# Patient Record
Sex: MALE | Race: WHITE | Employment: FULL TIME | URBAN - METROPOLITAN AREA
[De-identification: names, ages, dates, MRNs, and addresses within clinical notes are randomized per-mention and may not be internally consistent; named-entity substitution may affect disease eponyms.]

---

## 2021-11-27 ENCOUNTER — HOSPITAL ENCOUNTER (EMERGENCY)
Facility: CLINIC | Age: 51
Discharge: HOME | End: 2021-11-27
Attending: EMERGENCY MEDICINE
Payer: COMMERCIAL

## 2021-11-27 VITALS
TEMPERATURE: 97.6 F | DIASTOLIC BLOOD PRESSURE: 79 MMHG | HEIGHT: 74 IN | OXYGEN SATURATION: 100 % | RESPIRATION RATE: 20 BRPM | WEIGHT: 310 LBS | SYSTOLIC BLOOD PRESSURE: 158 MMHG | BODY MASS INDEX: 39.78 KG/M2 | HEART RATE: 53 BPM

## 2021-11-27 DIAGNOSIS — S70.12XA CONTUSION OF LEFT THIGH, INITIAL ENCOUNTER: ICD-10-CM

## 2021-11-27 DIAGNOSIS — S09.90XA CLOSED HEAD INJURY, INITIAL ENCOUNTER: Primary | ICD-10-CM

## 2021-11-27 PROCEDURE — 99284 EMERGENCY DEPT VISIT MOD MDM: CPT

## 2021-11-27 RX ORDER — LEVOTHYROXINE SODIUM 0.05 MG/1
50 TABLET ORAL DAILY
COMMUNITY

## 2021-11-27 ASSESSMENT — ENCOUNTER SYMPTOMS
RESPIRATORY NEGATIVE: 1
EYES NEGATIVE: 1
GASTROINTESTINAL NEGATIVE: 1

## 2021-11-27 ASSESSMENT — PAIN SCALES - GENERAL: PAINLEVEL_OUTOF10: 7

## 2021-11-27 NOTE — ED PROVIDER NOTES
[facility]  [facility]  Phone: [phone]        Pt Name: Kristi Lewis  MRN: [id number]  [place] 1970  Date of evaluation: 11/27/21    [address] [place] [address]       Chief Complaint   Patient presents with    Fall     pt states slipped and fell in shower and hit head on toilet today, denies loc, hit back left head. and left leg. HISTORY OF PRESENT ILLNESS (Location/Symptom, Timing/Onset, Context/Setting, Quality, Duration, Modifying Factors, Severity)      Kristi Lewis is a 54 y.o. male with no pertinent PMH who presents to the ED via private auto with some head pain after a slip and fall in the shower today when he fell out of the shower hit his head on the toilet. He denies any loss conscious or syncope to cause a fall. States he is able to initiate ready for work went to work, became dizzy where the dizziness resolved once he was able to sit down. He denies any change in vision, vomiting, neck pain, back pain, weakness, or numbness or tingling. He states he does not take any blood thinners and has been able to ambulate since injury. He also states that he hit his left thigh against the toilet as well but has been able to bear weight since injury. PAST MEDICAL / SURGICAL / SOCIAL / FAMILY HISTORY     PMH:  has a past medical history of Diabetes mellitus ([address] and Thyroid disease. Surgical History:  has no past surgical history on file. Social History:  reports that he has never smoked. He has never used smokeless tobacco. He reports current alcohol use. He reports that he does not use drugs. Family History: has no family status information on file. family history is not on file. Psychiatric History: None    Allergies: Patient has no known allergies. Home Medications:   Prior to Admission medications    Medication Sig Start Date End Date Taking?  Authorizing Provider   metFORMIN (GLUCOPHAGE) 1000 MG tablet Take 1,000 mg by mouth 2 times daily (with meals)   Yes Historical Provider, MD   levothyroxine (SYNTHROID) 50 MCG tablet Take 50 mcg by mouth Daily   Yes Historical Provider, MD       REVIEW OF SYSTEMS  (2-9 systems for level 4, 10 ormore for level 5)      Review of Systems   Constitutional: Negative. HENT: Negative. Eyes: Negative. Respiratory: Negative. Cardiovascular: Negative. Gastrointestinal: Negative. Genitourinary: Negative. Musculoskeletal: Positive for myalgias. Skin: Negative. Neurological: Positive for dizziness. Negative for tremors, seizures, syncope, facial asymmetry, speech difficulty, weakness, light-headedness, numbness and headaches. All other systems negative except as marked. PHYSICAL EXAM  (up to 7 for level 4, 8 or more for level 5)      INITIAL VITALS:  height is 6' 2\" (1.88 m) and weight is 140.6 kg (310 lb) (abnormal). His oral temperature is 97.6 °F (36.4 °C). His blood pressure is 158/79 (abnormal) and his pulse is 53. His respiration is 20 and oxygen saturation is 100%. Vital signs reviewed. Physical Exam  Constitutional:       General: He is not in acute distress. Appearance: Normal appearance. He is not toxic-appearing. HENT:      Head: Normocephalic. Contusion present. Nose: Nose normal. No congestion or rhinorrhea. Mouth/Throat:      Mouth: Mucous membranes are moist.      Pharynx: Oropharynx is clear. Eyes:      Extraocular Movements: Extraocular movements intact. Conjunctiva/sclera: Conjunctivae normal.      Pupils: Pupils are equal, round, and reactive to light. Neck:      Trachea: Trachea and phonation normal.   Cardiovascular:      Rate and Rhythm: Normal rate and regular rhythm. Pulses: Normal pulses. Heart sounds: Normal heart sounds. Pulmonary:      Effort: Pulmonary effort is normal.      Breath sounds: Normal breath sounds. Abdominal:      General: Abdomen is flat. Palpations: Abdomen is soft.    Musculoskeletal: General: Normal range of motion. Cervical back: Normal range of motion and neck supple. No rigidity or tenderness. No spinous process tenderness or muscular tenderness. Normal range of motion. Right hip: Normal.      Left hip: Normal.      Right upper leg: Normal.      Left upper leg: Swelling present. Right knee: Normal.      Left knee: Normal.      Right lower leg: Normal.      Left lower leg: Normal.      Right ankle: Normal.      Right Achilles Tendon: Normal.      Left ankle: Normal.      Left Achilles Tendon: Normal.      Right foot: Normal.      Left foot: Normal.        Legs:    Skin:     General: Skin is warm and dry. Capillary Refill: Capillary refill takes less than 2 seconds. Neurological:      General: No focal deficit present. Mental Status: He is alert. Mental status is at baseline. Cranial Nerves: No cranial nerve deficit. Sensory: No sensory deficit. Motor: No weakness. Gait: Gait normal.   Psychiatric:         Mood and Affect: Mood normal.         Behavior: Behavior normal.           DIFFERENTIAL DIAGNOSIS / MDM     After my physical exam, I did offer the patient a CT scan of his head to rule out any intracranial abnormality causing patient symptoms after his fall. I also offer the patient an x-ray of the left femur as he does complain of some left leg pain and does have a contusion to his left thigh. Patient did refuse imaging at this time saying that he is unable to afford it and states that he believes he just has a concussion and wanted to be examined. Patient does not take any blood thinners at this time. Patient is right mind and is alert and oriented x3 during my exam.  I did recommend that the patient return to the ER with any worsening symptoms, headache, dizziness, vision changes, neck pain, back pain, nausea vomiting diarrhea, chest pain, shortness of breath or any mental status change.   Also recommend the patient follow-up with his PCP on Monday regardless for monitoring the symptoms. Patient is agreement with this plan at this time. All question concerns answered at this time. The patient presents with a closed head injury. The patient is neurologically intact. The presentation does not suggest a serious head injury. Signs and symptoms of a serious head injury have been discussed with the patient and caregiver, who will be vigilant for these. Concerns of repeat head injury have also been discussed. The patient has been observed adequately in the ED. Pt has been instructed to return to the ED if symptoms do not go away or worsen or change in any way. The patient understands that at this time there is no evidence for a more malignant underlying process, but the patient also understands that early in the process of an illness or injury, an emergency department workup can be falsely reassuring. Routine discharge counseling was given, and the patient understands that worsening, changing or persistent symptoms should prompt an immediate call or follow up with their primary physician or return to the emergency department. The importance of appropriate follow up was also discussed. I have reviewed the disposition diagnosis with the patient and or their family/guardian. I have answered their questions and given discharge instructions. They voiced understanding of these instructions and did not have any further questions or complaints. PLAN (LABS / IMAGING / EKG):  No orders of the defined types were placed in this encounter. MEDICATIONS ORDERED:  No orders of the defined types were placed in this encounter. Controlled Substances Monitoring:     DIAGNOSTIC RESULTS     EKG: All EKG's are interpreted by the Emergency Department Physician who either signs or Co-signs this chart in the absenceof a cardiologist.        RADIOLOGY: All images are read by the radiologist and their interpretations are reviewed.     No orders to display No results found. LABS:  No results found for this visit on 11/27/21. EMERGENCY DEPARTMENT COURSE           Vitals:    Vitals:    11/27/21 1544   BP: (!) 158/79   Pulse: 53   Resp: 20   Temp: 97.6 °F (36.4 °C)   TempSrc: Oral   SpO2: 100%   Weight: (!) 140.6 kg (310 lb)   Height: 6' 2\" (1.88 m)     -------------------------  BP: (!) 158/79, Temp: 97.6 °F (36.4 °C), Pulse: 53, Resp: 20      RE-EVALUATION:  See ED Course notes above. CONSULTS:  None    PROCEDURES:  None    FINAL IMPRESSION      1. Closed head injury, initial encounter    2. Contusion of left thigh, initial encounter          DISPOSITION / PLAN     CONDITION ON DISPOSITION:   Stable for discharge. PATIENT REFERRED TO:  [facility]  [address] [place] [address],[place] [address]  [phone]    If symptoms worsen      Please call your PCP in one day for follow up. If you do not have a PCP you can Call [phone] ([phone]) to be established with a PCP.           DISCHARGE MEDICATIONS:  New Prescriptions    No medications on file       EDVIN Jara - [place]   Emergency Medicine Nurse Practitioner    (Please note that portions of this note were completed with a voice recognition program.  Efforts were made to edit the dictations but occasionally words aremis-transcribed.)       EDVIN Jara - CNP  11/27/21 2739

## 2021-11-27 NOTE — Clinical Note
Khushbu Welsh was seen and treated in our emergency department on 11/27/2021. He may return to work on 11/28/2021. If you have any questions or concerns, please don't hesitate to call.       EDVIN Sánchez - CNP

## 2021-11-27 NOTE — ED PROVIDER NOTES
I was present in the ED during this patient's evaluation and management by the Advance Practice Provider and was available to address any concerns about their medical management.     Kieran Tamayo MD  Attending, Emergency Department      Cortez Ortiz MD  11/27/21 2345